# Patient Record
Sex: FEMALE | Race: WHITE | Employment: OTHER | URBAN - METROPOLITAN AREA
[De-identification: names, ages, dates, MRNs, and addresses within clinical notes are randomized per-mention and may not be internally consistent; named-entity substitution may affect disease eponyms.]

---

## 2021-03-11 ENCOUNTER — HOSPITAL ENCOUNTER (EMERGENCY)
Facility: CLINIC | Age: 21
Discharge: HOME | End: 2021-03-11
Attending: EMERGENCY MEDICINE | Admitting: EMERGENCY MEDICINE
Payer: COMMERCIAL

## 2021-03-11 VITALS
WEIGHT: 170 LBS | SYSTOLIC BLOOD PRESSURE: 144 MMHG | TEMPERATURE: 98.5 F | RESPIRATION RATE: 18 BRPM | OXYGEN SATURATION: 97 % | DIASTOLIC BLOOD PRESSURE: 134 MMHG | HEART RATE: 59 BPM

## 2021-03-11 DIAGNOSIS — J02.9 PHARYNGITIS, UNSPECIFIED ETIOLOGY: ICD-10-CM

## 2021-03-11 DIAGNOSIS — L27.0 ALLERGIC DRUG RASH: ICD-10-CM

## 2021-03-11 DIAGNOSIS — J01.90 ACUTE SINUSITIS WITH SYMPTOMS > 10 DAYS: ICD-10-CM

## 2021-03-11 LAB
DEPRECATED S PYO AG THROAT QL EIA: NEGATIVE
HETEROPH AB SER QL: NEGATIVE
SPECIMEN SOURCE: NORMAL
SPECIMEN SOURCE: NORMAL
STREP GROUP A PCR: NOT DETECTED

## 2021-03-11 PROCEDURE — 99284 EMERGENCY DEPT VISIT MOD MDM: CPT | Performed by: EMERGENCY MEDICINE

## 2021-03-11 PROCEDURE — 86308 HETEROPHILE ANTIBODY SCREEN: CPT | Performed by: EMERGENCY MEDICINE

## 2021-03-11 PROCEDURE — 250N000009 HC RX 250: Performed by: EMERGENCY MEDICINE

## 2021-03-11 PROCEDURE — 99283 EMERGENCY DEPT VISIT LOW MDM: CPT | Performed by: EMERGENCY MEDICINE

## 2021-03-11 PROCEDURE — 250N000013 HC RX MED GY IP 250 OP 250 PS 637: Performed by: EMERGENCY MEDICINE

## 2021-03-11 PROCEDURE — 87651 STREP A DNA AMP PROBE: CPT | Performed by: EMERGENCY MEDICINE

## 2021-03-11 PROCEDURE — 36415 COLL VENOUS BLD VENIPUNCTURE: CPT | Performed by: EMERGENCY MEDICINE

## 2021-03-11 PROCEDURE — 999N001174 HC STATISTIC STREP A RAPID: Performed by: EMERGENCY MEDICINE

## 2021-03-11 RX ORDER — ETONOGESTREL 68 MG/1
1 IMPLANT SUBCUTANEOUS ONCE
COMMUNITY

## 2021-03-11 RX ORDER — VALACYCLOVIR HYDROCHLORIDE 500 MG/1
2000 TABLET, FILM COATED ORAL 2 TIMES DAILY PRN
COMMUNITY
Start: 2021-02-04

## 2021-03-11 RX ORDER — CEFDINIR 300 MG/1
300 CAPSULE ORAL 2 TIMES DAILY
Qty: 20 CAPSULE | Refills: 0 | Status: SHIPPED | OUTPATIENT
Start: 2021-03-11

## 2021-03-11 RX ORDER — DIPHENHYDRAMINE HCL 25 MG
50 CAPSULE ORAL ONCE
Status: COMPLETED | OUTPATIENT
Start: 2021-03-11 | End: 2021-03-11

## 2021-03-11 RX ORDER — VALACYCLOVIR HYDROCHLORIDE 1 G/1
1000 TABLET, FILM COATED ORAL
Qty: 10 TABLET | Refills: 1 | Status: SHIPPED | OUTPATIENT
Start: 2021-03-11

## 2021-03-11 RX ORDER — FLUCONAZOLE 150 MG/1
150 TABLET ORAL DAILY
Qty: 2 TABLET | Refills: 0 | Status: SHIPPED | OUTPATIENT
Start: 2021-03-11 | End: 2021-03-13

## 2021-03-11 RX ORDER — DEXAMETHASONE SODIUM PHOSPHATE 10 MG/ML
10 INJECTION, SOLUTION INTRAMUSCULAR; INTRAVENOUS ONCE
Status: COMPLETED | OUTPATIENT
Start: 2021-03-11 | End: 2021-03-11

## 2021-03-11 RX ADMIN — DIPHENHYDRAMINE HYDROCHLORIDE 50 MG: 25 CAPSULE ORAL at 11:44

## 2021-03-11 RX ADMIN — DEXAMETHASONE SODIUM PHOSPHATE 10 MG: 10 INJECTION, SOLUTION INTRAMUSCULAR; INTRAVENOUS at 11:44

## 2021-03-11 NOTE — ED PROVIDER NOTES
History     Chief Complaint   Patient presents with     Allergic Reaction     HPI  Anyi Toussaint is a 20 year old female who presents with an allergic reaction. She was seen by her PCP on Tuesday for a sinus infection and was started on Augmentin. She endorses sore throat, fever, chills, otalgia, rhinorrhea, congestion, abdominal pain, and diarrhea. Covid test on Tuesday was negative. This morning, patient woke up with throbbing, throat pain, rating her pain a 10/10. She also had a rash on her face and neck with hives. The rash has since subsided, without any Benadryl use. Patient did take her Augmentin dose this morning. Strep test was not done at her Tuesday appointment.     Allergies:  Allergies   Allergen Reactions     Seasonal Allergies        Problem List:    There are no active problems to display for this patient.       Past Medical History:    No past medical history on file.    Past Surgical History:    No past surgical history on file.    Family History:    No family history on file.    Social History:  Marital Status:  Single [1]  Social History     Tobacco Use     Smoking status: Never Smoker     Smokeless tobacco: Never Used   Substance Use Topics     Alcohol use: Not on file     Drug use: Not on file        Medications:    ACETAMINOPHEN  Dextromethorphan HBr (ROBITUSSIN CHILDRENS COUGH LA) 7.5 MG/5ML SYRP  fexofenadine (ALLEGRA) 180 MG tablet  fluticasone (FLONASE) 50 MCG/ACT nasal spray  IBUPROFEN CHILDRENS PO  Naproxen Sodium (ALEVE PO)  Tqojosbeg-EXO-DV-APAP (TYLENOL CHILDRENS COUGH) 15-1-5-160 MG/5ML SYRP          Review of Systems   All other systems reviewed and are negative.      Physical Exam   BP: (!) 150/118  Pulse: 80  Temp: 98.5  F (36.9  C)  Resp: 18  Weight: 77.1 kg (170 lb)  SpO2: 99 %      Physical Exam  Constitutional:       General: She is not in acute distress.     Appearance: Normal appearance. She is not ill-appearing, toxic-appearing or diaphoretic.   HENT:      Head:  Normocephalic and atraumatic.      Right Ear: Tympanic membrane normal. There is no impacted cerumen.      Left Ear: Tympanic membrane normal. There is no impacted cerumen.      Nose: Congestion and rhinorrhea present.      Mouth/Throat:      Mouth: Mucous membranes are moist.      Pharynx: Pharyngeal swelling and posterior oropharyngeal erythema present.      Comments: odynophagia   Eyes:      Extraocular Movements: Extraocular movements intact.      Pupils: Pupils are equal, round, and reactive to light.   Neck:      Musculoskeletal: Muscular tenderness present. No neck rigidity.      Vascular: No carotid bruit.   Cardiovascular:      Rate and Rhythm: Normal rate and regular rhythm.      Pulses: Normal pulses.      Heart sounds: Normal heart sounds. No murmur.   Pulmonary:      Effort: Pulmonary effort is normal. No respiratory distress.      Breath sounds: Normal breath sounds. No stridor. No wheezing, rhonchi or rales.   Chest:      Chest wall: No tenderness.   Abdominal:      General: Abdomen is flat. Bowel sounds are normal. There is no distension.      Palpations: Abdomen is soft. There is no mass.      Tenderness: There is no abdominal tenderness. There is no guarding or rebound.      Hernia: No hernia is present.   Musculoskeletal:         General: No swelling.   Lymphadenopathy:      Cervical: Cervical adenopathy present.   Skin:     General: Skin is warm.      Coloration: Skin is pale.      Findings: No rash.   Neurological:      General: No focal deficit present.      Mental Status: She is alert and oriented to person, place, and time.      Cranial Nerves: No cranial nerve deficit.   Psychiatric:         Mood and Affect: Mood normal.         Behavior: Behavior normal.         ED Course        Procedures               Critical Care time:  none               No results found for this or any previous visit (from the past 24 hour(s)).    Medications - No data to display    Assessments & Plan (with Medical  Decision Making)   Anyi is a 20-year-old female who presents with an allergic reaction. She was seen on Tuesday by her PCP for a sinus infection and started on Augmentin. Covid test on Tuesday was negative. Endorses sore throat, fever, chills, otalgia, rhinorrhea, congestion, abdominal pain, and diarrhea. This morning, patient woke up with throbbing, throat pain 10/10 with significant odynophagia. She also had a rash on her face and neck with hives. The rash has since subsided, without any Benadryl use. Patient did take her Augmentin dose this morning.    On examination, /93, T 98.5, HR 75, RR 18, SpO2 99%.   Strep test was negative.    1110: Administered 10 mg of oral decadron and 50 mg of benadryl.    Will check mono spot to evaluate if odynophagia and rash is due to mono virus or a reaction from the Augmentin.       I have reviewed the nursing notes.    I have reviewed the findings, diagnosis, plan and need for follow up with the patient.       New Prescriptions    No medications on file       Final diagnoses:   Acute sinusitis with symptoms > 10 days   Allergic drug rash   Pharyngitis, unspecified etiology     As scribed by ANGELINA Walsh .  Note reviewed and addended by Erich Alonzo DO.  Patient evaluted and seen by Erich Alonzo DO.   3/11/2021   [facility] EMERGENCY DEPT     Venkat Alonzo DO  03/11/21 [id number]

## 2021-03-11 NOTE — DISCHARGE INSTRUCTIONS
1.  Omnicef as directed for sinus infection  2.  Diflucan 1 tablet first day of antibiotics and repeat day 7.  This will reduce risk for yeast vaginitis secondary to antibiotic use.  3.  Referral to ears nose and throat for consideration of tonsillectomy please call Dr. Edward for appt. he has clinic hours of the [facility].  His phone number is [phone].  4.  Valtrex 1000 mg to be taken at the very onset of tingling in the lip (early warning sign for outbreak of oral herpes lesion).  5.  Stop Augmentin   6.  For rash or ongoing allergies from Augmentin you may use Benadryl 25-50 mg every 6-8 hours.  This is sedating and can caused constipation.  An alternate antihistamine would be Zyrtec 10 mg twice daily.  This can be purchased over-the-counter it is less sedating.

## 2021-03-11 NOTE — ED TRIAGE NOTES
Started on augmentin 2 days ago for sinus infection, negative covid test on Tuesday. She states ever since she started taking the medication she has throat swelling and hives.